# Patient Record
Sex: MALE | Race: WHITE | NOT HISPANIC OR LATINO | URBAN - METROPOLITAN AREA
[De-identification: names, ages, dates, MRNs, and addresses within clinical notes are randomized per-mention and may not be internally consistent; named-entity substitution may affect disease eponyms.]

---

## 2025-03-14 ENCOUNTER — HOSPITAL ENCOUNTER (OUTPATIENT)
Dept: RADIOLOGY | Facility: CLINIC | Age: 80
Discharge: HOME | End: 2025-03-14
Payer: MEDICARE

## 2025-03-14 ENCOUNTER — OFFICE VISIT (OUTPATIENT)
Dept: ORTHOPEDIC SURGERY | Facility: CLINIC | Age: 80
End: 2025-03-14
Payer: MEDICARE

## 2025-03-14 DIAGNOSIS — M25.512 ACUTE PAIN OF LEFT SHOULDER: ICD-10-CM

## 2025-03-14 DIAGNOSIS — M75.102 TEAR OF LEFT ROTATOR CUFF, UNSPECIFIED TEAR EXTENT, UNSPECIFIED WHETHER TRAUMATIC: ICD-10-CM

## 2025-03-14 PROCEDURE — 73030 X-RAY EXAM OF SHOULDER: CPT | Mod: LT

## 2025-03-14 NOTE — PROGRESS NOTES
History of Present Illness:   Patient presents today endorsing left shoulder pain.  The pain is worse with overhead activity and tends to wake the patient at night.  The patient denies a traumatic injury.   The pain is sharp in nature, localizes lateral and deep.  Better with rest.    Patient denies any obvious strength loss but reveals the left shoulder has become less functional as time is gone on.  He said pain for many months without acute injury.    He does have a history of multiple stent placements, takes Eliquis for occasional A-fib as well as history of stents.  Unable to take NSAIDs.    Past Medical History:   Diagnosis Date    Personal history of other diseases of the digestive system     History of acute pancreatitis     Past Surgical History:   Procedure Laterality Date    CHOLECYSTECTOMY  08/12/2013    Cholecystectomy    COLONOSCOPY  10/03/2013    Complete Colonoscopy    CT GUIDED PERCUTANEOUS BIOPSY MEDIASTINUM  10/17/2012    CT GUIDED PERCUTANEOUS BIOPSY MEDIASTINUM 10/17/2012 [facility]    LUMBAR FUSION  08/12/2013    Lumbar Vertebral Fusion    OTHER SURGICAL HISTORY  08/12/2013    Previous Stent Placement    TONSILLECTOMY  10/03/2013    Tonsillectomy    TOTAL HIP ARTHROPLASTY  08/12/2013    Hip Replacement    TOTAL HIP ARTHROPLASTY  08/12/2013    Hip Replacement     No current outpatient medications on file.    Review of Systems   GENERAL: Negative for malaise, significant weight loss, fever  MUSCULOSKELETAL: see HPI  NEURO:  Negative    Physical Examination:  Left Shoulder:  Skin healthy to gross inspection  No ecchymosis, no swelling, no gross atrophy  No tenderness to palpation over acromioclavicular joint  No tenderness to palpation over biceps tendon  No tenderness to palpation over the cervical spine     ROM: SYMMETRIC, MILD LOSS OF INTERNAL ROTATION COMPARED TO NORMAL ANATOMY  Strength:  4/5 Resisted elevation, 4+/5 Resisted external rotation     Pain with lift off and Speeds test    Negative Spurling´s test  Positive Neer and Hawkin´s test  Neurovascular exam normal distally    Imaging:  Radiographs demonstrate  no acute fractures or dislocation, good alignment position, no significant changes to the humeral head height, there is a mild to moderate size inferior glenoid and inferior humeral head osteophyte without substantial joint space narrowing.    Assessment:  Patient with left shoulder pain concern for a full thickness rotator cuff tear, in the setting of moderate inferior glenohumeral DJD    Plan:  We discussed our presumptive diagnosis with the patient.  Based on the physical exam and symptoms we have a high clinical suspicion for a rotator cuff tear.  We reviewed the role of imaging, physical therapy, injections and the time frame to surgery and correlation with outcome.  We reviewed the patient that we feel his loss of internal rotation could be explained by inferior humeral head and glenoid osteophyte, his strength loss however is likely a component of rotator cuff problems  The patient elected for: MRI for further evaluation, we encouraged physical therapy, he is agreeable.    donna Gavin PA-C

## 2025-04-11 ENCOUNTER — HOSPITAL ENCOUNTER (OUTPATIENT)
Dept: RADIOLOGY | Facility: HOSPITAL | Age: 80
Discharge: HOME | End: 2025-04-11
Payer: MEDICARE

## 2025-04-11 DIAGNOSIS — M25.512 ACUTE PAIN OF LEFT SHOULDER: ICD-10-CM

## 2025-04-11 DIAGNOSIS — M75.102 TEAR OF LEFT ROTATOR CUFF, UNSPECIFIED TEAR EXTENT, UNSPECIFIED WHETHER TRAUMATIC: ICD-10-CM

## 2025-04-11 PROCEDURE — 73221 MRI JOINT UPR EXTREM W/O DYE: CPT | Mod: LT

## 2025-04-14 ENCOUNTER — APPOINTMENT (OUTPATIENT)
Dept: ORTHOPEDIC SURGERY | Facility: CLINIC | Age: 80
End: 2025-04-14
Payer: MEDICARE

## 2025-04-22 ENCOUNTER — OFFICE VISIT (OUTPATIENT)
Dept: ORTHOPEDIC SURGERY | Facility: CLINIC | Age: 80
End: 2025-04-22
Payer: MEDICARE

## 2025-04-22 DIAGNOSIS — M75.102 TEAR OF LEFT ROTATOR CUFF, UNSPECIFIED TEAR EXTENT, UNSPECIFIED WHETHER TRAUMATIC: Primary | ICD-10-CM

## 2025-04-22 PROCEDURE — 20610 DRAIN/INJ JOINT/BURSA W/O US: CPT | Mod: LT | Performed by: ORTHOPAEDIC SURGERY

## 2025-04-22 PROCEDURE — 99214 OFFICE O/P EST MOD 30 MIN: CPT | Performed by: ORTHOPAEDIC SURGERY

## 2025-04-22 PROCEDURE — 2500000004 HC RX 250 GENERAL PHARMACY W/ HCPCS (ALT 636 FOR OP/ED): Performed by: ORTHOPAEDIC SURGERY

## 2025-04-22 PROCEDURE — 1159F MED LIST DOCD IN RCRD: CPT | Performed by: ORTHOPAEDIC SURGERY

## 2025-04-22 RX ORDER — LIDOCAINE HYDROCHLORIDE 10 MG/ML
2 INJECTION, SOLUTION INFILTRATION; PERINEURAL
Status: COMPLETED | OUTPATIENT
Start: 2025-04-22 | End: 2025-04-22

## 2025-04-22 RX ORDER — TRIAMCINOLONE ACETONIDE 40 MG/ML
40 INJECTION, SUSPENSION INTRA-ARTICULAR; INTRAMUSCULAR
Status: COMPLETED | OUTPATIENT
Start: 2025-04-22 | End: 2025-04-22

## 2025-04-22 RX ADMIN — LIDOCAINE HYDROCHLORIDE 2 ML: 10 INJECTION, SOLUTION INFILTRATION; PERINEURAL at 11:13

## 2025-04-22 RX ADMIN — TRIAMCINOLONE ACETONIDE 40 MG: 40 INJECTION, SUSPENSION INTRA-ARTICULAR; INTRAMUSCULAR at 11:13

## 2025-04-22 NOTE — PROGRESS NOTES
History of present illness:  Patient returns today for review of his MRI his pain is predominately deep and lateral in the shoulder.  He denies any significant pain in the region of his biceps.  He denies any major weakness with minor activities but does have some nocturnal pain.    Medical History[1]  Surgical History[2]  Current Medications[3]    Review of Systems   GENERAL: Negative for malaise, significant weight loss, fever  MUSCULOSKELETAL: see HPI  NEURO:  Negative    Physical Examination:  Left Shoulder:  Skin healthy to gross inspection  No ecchymosis, no swelling, no gross atrophy  No tenderness to palpation over acromioclavicular joint  No tenderness to palpation over biceps tendon  No tenderness to palpation over the cervical spine     ROM: tolerates passive ROM  Strength:  Subtle weakness noted    Pain with lift off and Speeds test   Negative Spurling´s test  Positive Neer and Hawkin´s test  Neurovascular exam normal distally    Imaging:  MRI: Significant tendinopathy and likely partial tearing of supraspinatus , peroneal spur noted likely some chronic tearing and subluxation long head of the biceps    Assessment:  Patient with left shoulder pain tendinopathy, acromial spur, partial tearing of biceps and supraspinatus    Plan:  We reviewed partial rotator cuff tears discussing the disease process as well as the possibility of propagation of a full-thickness tear.  Nonsurgical management was discussed as well as arthroscopy including debridement versus completion and takedown.  We discussed approximately 50% tearing as the cut off but also discussed the overall quality and condition of the tendon including tendinosis or significant tendinitis could impact that decision.  This determination is often difficult to make based on MRI alone but tissue quality at the time of surgery is useful to aid in decision-making.    Recommend corticosteroid injection consider arthroscopy down the road.      L Inj/Asp: L  subacromial bursa on 4/22/2025 11:13 AM  Indications: pain  Details: 22 G needle, posterior approach  Medications: 2 mL lidocaine 10 mg/mL (1 %); 40 mg triamcinolone acetonide 40 mg/mL  Outcome: tolerated well, no immediate complications  Procedure, treatment alternatives, risks and benefits explained, specific risks discussed. Consent was given by the patient. Immediately prior to procedure a time out was called to verify the correct patient, procedure, equipment, support staff and site/side marked as required. Patient was prepped and draped in the usual sterile fashion.                    [1]   Past Medical History:  Diagnosis Date    Personal history of other diseases of the digestive system     History of acute pancreatitis   [2]   Past Surgical History:  Procedure Laterality Date    CHOLECYSTECTOMY  08/12/2013    Cholecystectomy    COLONOSCOPY  10/03/2013    Complete Colonoscopy    CT GUIDED PERCUTANEOUS BIOPSY MEDIASTINUM  10/17/2012    CT GUIDED PERCUTANEOUS BIOPSY MEDIASTINUM 10/17/2012 [facility]    LUMBAR FUSION  08/12/2013    Lumbar Vertebral Fusion    OTHER SURGICAL HISTORY  08/12/2013    Previous Stent Placement    TONSILLECTOMY  10/03/2013    Tonsillectomy    TOTAL HIP ARTHROPLASTY  08/12/2013    Hip Replacement    TOTAL HIP ARTHROPLASTY  08/12/2013    Hip Replacement   [3] No current outpatient medications on file.

## 2025-09-18 ENCOUNTER — APPOINTMENT (OUTPATIENT)
Age: 80
End: 2025-09-18
Payer: MEDICARE